# Patient Record
Sex: FEMALE | Race: OTHER | HISPANIC OR LATINO
[De-identification: names, ages, dates, MRNs, and addresses within clinical notes are randomized per-mention and may not be internally consistent; named-entity substitution may affect disease eponyms.]

---

## 2017-01-31 ENCOUNTER — APPOINTMENT (OUTPATIENT)
Dept: OBGYN | Facility: CLINIC | Age: 22
End: 2017-01-31

## 2017-01-31 VITALS — DIASTOLIC BLOOD PRESSURE: 52 MMHG | SYSTOLIC BLOOD PRESSURE: 108 MMHG | WEIGHT: 112 LBS

## 2020-05-18 ENCOUNTER — EMERGENCY (EMERGENCY)
Facility: HOSPITAL | Age: 25
LOS: 1 days | Discharge: HOME | End: 2020-05-18
Attending: STUDENT IN AN ORGANIZED HEALTH CARE EDUCATION/TRAINING PROGRAM | Admitting: STUDENT IN AN ORGANIZED HEALTH CARE EDUCATION/TRAINING PROGRAM
Payer: MEDICAID

## 2020-05-18 VITALS
DIASTOLIC BLOOD PRESSURE: 81 MMHG | HEART RATE: 88 BPM | RESPIRATION RATE: 16 BRPM | TEMPERATURE: 100 F | OXYGEN SATURATION: 100 % | SYSTOLIC BLOOD PRESSURE: 126 MMHG

## 2020-05-18 LAB
ALBUMIN SERPL ELPH-MCNC: 4.5 G/DL — SIGNIFICANT CHANGE UP (ref 3.3–5)
ALP SERPL-CCNC: 69 U/L — SIGNIFICANT CHANGE UP (ref 40–120)
ALT FLD-CCNC: 13 U/L — SIGNIFICANT CHANGE UP (ref 4–33)
ANION GAP SERPL CALC-SCNC: 14 MMO/L — SIGNIFICANT CHANGE UP (ref 7–14)
AST SERPL-CCNC: 17 U/L — SIGNIFICANT CHANGE UP (ref 4–32)
BASOPHILS # BLD AUTO: 0.03 K/UL — SIGNIFICANT CHANGE UP (ref 0–0.2)
BASOPHILS NFR BLD AUTO: 0.3 % — SIGNIFICANT CHANGE UP (ref 0–2)
BILIRUB SERPL-MCNC: 0.4 MG/DL — SIGNIFICANT CHANGE UP (ref 0.2–1.2)
BUN SERPL-MCNC: 8 MG/DL — SIGNIFICANT CHANGE UP (ref 7–23)
CALCIUM SERPL-MCNC: 9.5 MG/DL — SIGNIFICANT CHANGE UP (ref 8.4–10.5)
CHLORIDE SERPL-SCNC: 104 MMOL/L — SIGNIFICANT CHANGE UP (ref 98–107)
CO2 SERPL-SCNC: 23 MMOL/L — SIGNIFICANT CHANGE UP (ref 22–31)
CREAT SERPL-MCNC: 0.66 MG/DL — SIGNIFICANT CHANGE UP (ref 0.5–1.3)
EOSINOPHIL # BLD AUTO: 0.1 K/UL — SIGNIFICANT CHANGE UP (ref 0–0.5)
EOSINOPHIL NFR BLD AUTO: 0.8 % — SIGNIFICANT CHANGE UP (ref 0–6)
GLUCOSE SERPL-MCNC: 89 MG/DL — SIGNIFICANT CHANGE UP (ref 70–99)
HCT VFR BLD CALC: 42.4 % — SIGNIFICANT CHANGE UP (ref 34.5–45)
HGB BLD-MCNC: 14.5 G/DL — SIGNIFICANT CHANGE UP (ref 11.5–15.5)
IMM GRANULOCYTES NFR BLD AUTO: 0.3 % — SIGNIFICANT CHANGE UP (ref 0–1.5)
LYMPHOCYTES # BLD AUTO: 1.88 K/UL — SIGNIFICANT CHANGE UP (ref 1–3.3)
LYMPHOCYTES # BLD AUTO: 15.7 % — SIGNIFICANT CHANGE UP (ref 13–44)
MAGNESIUM SERPL-MCNC: 2 MG/DL — SIGNIFICANT CHANGE UP (ref 1.6–2.6)
MCHC RBC-ENTMCNC: 30 PG — SIGNIFICANT CHANGE UP (ref 27–34)
MCHC RBC-ENTMCNC: 34.2 % — SIGNIFICANT CHANGE UP (ref 32–36)
MCV RBC AUTO: 87.6 FL — SIGNIFICANT CHANGE UP (ref 80–100)
MONOCYTES # BLD AUTO: 0.57 K/UL — SIGNIFICANT CHANGE UP (ref 0–0.9)
MONOCYTES NFR BLD AUTO: 4.8 % — SIGNIFICANT CHANGE UP (ref 2–14)
NEUTROPHILS # BLD AUTO: 9.37 K/UL — HIGH (ref 1.8–7.4)
NEUTROPHILS NFR BLD AUTO: 78.1 % — HIGH (ref 43–77)
NRBC # FLD: 0 K/UL — SIGNIFICANT CHANGE UP (ref 0–0)
PHOSPHATE SERPL-MCNC: 3.4 MG/DL — SIGNIFICANT CHANGE UP (ref 2.5–4.5)
PLATELET # BLD AUTO: 251 K/UL — SIGNIFICANT CHANGE UP (ref 150–400)
PMV BLD: 11.4 FL — SIGNIFICANT CHANGE UP (ref 7–13)
POTASSIUM SERPL-MCNC: 4.1 MMOL/L — SIGNIFICANT CHANGE UP (ref 3.5–5.3)
POTASSIUM SERPL-SCNC: 4.1 MMOL/L — SIGNIFICANT CHANGE UP (ref 3.5–5.3)
PROT SERPL-MCNC: 8 G/DL — SIGNIFICANT CHANGE UP (ref 6–8.3)
RBC # BLD: 4.84 M/UL — SIGNIFICANT CHANGE UP (ref 3.8–5.2)
RBC # FLD: 12.3 % — SIGNIFICANT CHANGE UP (ref 10.3–14.5)
SODIUM SERPL-SCNC: 141 MMOL/L — SIGNIFICANT CHANGE UP (ref 135–145)
WBC # BLD: 11.99 K/UL — HIGH (ref 3.8–10.5)
WBC # FLD AUTO: 11.99 K/UL — HIGH (ref 3.8–10.5)

## 2020-05-18 PROCEDURE — 99285 EMERGENCY DEPT VISIT HI MDM: CPT | Mod: 25

## 2020-05-18 PROCEDURE — 93010 ELECTROCARDIOGRAM REPORT: CPT

## 2020-05-18 RX ORDER — ACETAMINOPHEN 500 MG
650 TABLET ORAL ONCE
Refills: 0 | Status: COMPLETED | OUTPATIENT
Start: 2020-05-18 | End: 2020-05-18

## 2020-05-18 RX ORDER — SODIUM CHLORIDE 9 MG/ML
1000 INJECTION INTRAMUSCULAR; INTRAVENOUS; SUBCUTANEOUS ONCE
Refills: 0 | Status: COMPLETED | OUTPATIENT
Start: 2020-05-18 | End: 2020-05-18

## 2020-05-18 RX ADMIN — Medication 650 MILLIGRAM(S): at 19:13

## 2020-05-18 RX ADMIN — SODIUM CHLORIDE 1000 MILLILITER(S): 9 INJECTION INTRAMUSCULAR; INTRAVENOUS; SUBCUTANEOUS at 19:13

## 2020-05-18 NOTE — ED PROVIDER NOTE - NSFOLLOWUPCLINICS_GEN_ALL_ED_FT
[facility] - Primary Care  Primary Care  [address] [place] [address]  Phone: [phone]  Fax:   Follow Up Time: 4-6 Days

## 2020-05-18 NOTE — ED PROVIDER NOTE - PATIENT PORTAL LINK FT
You can access the FollowMyHealth Patient Portal offered by [facility] by registering at the following website: [email or web address]. By joining [employer]’s FollowMyHealth portal, you will also be able to view your health information using other applications (apps) compatible with our system.

## 2020-05-18 NOTE — ED PROVIDER NOTE - CLINICAL SUMMARY MEDICAL DECISION MAKING FREE TEXT BOX
here after 2 syncopal episodes in the setting of exercise. ekg without signs of HOCM, arrhythmia, QT prolongation or ischemia. neuro exam nonfocal at this time, no concern for seizure given no postictal period no tongue biting and no incontinence. will evaluate for electrolyte disturbance, anemia, pregnancy with labs, and hcg, give fluids and tylenol and reassess, concussion precautions discussed.

## 2020-05-18 NOTE — ED PROVIDER NOTE - PHYSICAL EXAMINATION
Stephanie Rios M.D.:   patient awake alert NAD .   LUNGS CTAB no wheeze no crackle.   CARD RRR no m/r/g.    Abdomen soft NT ND no rebound no guarding no CVA tenderness.   EXT WWP no edema no calf tenderness CV 2+DP/PT bilaterally.   neuro A&Ox3 cn2-12 intact gross motor and sensory intact in all extremities gait normal.    skin warm and dry no rash  HEENT: moist mucous membranes, PERRL, EOMI
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
## 2020-05-18 NOTE — ED PROVIDER NOTE - PROGRESS NOTE DETAILS
Demetris ANSARI MD PGY2: Patient continues to do well. Pain on head exclusively when touching it. WIll d/c to follow-up with PMD.

## 2020-05-18 NOTE — ED PROVIDER NOTE - NSFOLLOWUPINSTRUCTIONS_ED_ALL_ED_FT
Please return to the ED for any concerns, including another passing out episode, nausea, vomiting, weakness, or numbness.     Please follow-up with your primary care doctor in the next week.     Here in the ED your blood work was normal and you were observed for 2.5 hours. Please return to the ED for any concerns, including another passing out episode, nausea, vomiting, weakness, or numbness.     Please follow-up with your primary care doctor in the next week.

## 2020-05-18 NOTE — ED PROVIDER NOTE - OBJECTIVE STATEMENT
Stephanie Rios M.D: 24F hx cleft palate s/p repair presenting after syncopal episode. notes she was in usual state of health, was bioking with family when she started to feel chest tightness and then her vision went black and she passed out and hti her head. +LOC xfew seconds. woke up was back to baseline mental status and then passed out again for a few seconds. no further episodes. currently c/o headache, posterior. no vision changes no abd pain no n/v no known bleeding no dizziness/lightheadedness, no more chest tightness. notes she gets chest tightness with her allergies sometimes.
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
## 2023-01-05 ENCOUNTER — EMERGENCY (EMERGENCY)
Facility: HOSPITAL | Age: 28
LOS: 1 days | Discharge: HOME | End: 2023-01-05
Attending: EMERGENCY MEDICINE | Admitting: EMERGENCY MEDICINE
Payer: MEDICAID

## 2023-01-05 VITALS
SYSTOLIC BLOOD PRESSURE: 139 MMHG | RESPIRATION RATE: 20 BRPM | OXYGEN SATURATION: 97 % | HEART RATE: 89 BPM | DIASTOLIC BLOOD PRESSURE: 74 MMHG | TEMPERATURE: 98 F

## 2023-01-05 PROCEDURE — 99284 EMERGENCY DEPT VISIT MOD MDM: CPT

## 2023-01-05 RX ORDER — DEXAMETHASONE 0.5 MG/5ML
6 ELIXIR ORAL ONCE
Refills: 0 | Status: COMPLETED | OUTPATIENT
Start: 2023-01-05 | End: 2023-01-05

## 2023-01-05 RX ORDER — MECLIZINE HCL 12.5 MG
25 TABLET ORAL ONCE
Refills: 0 | Status: COMPLETED | OUTPATIENT
Start: 2023-01-05 | End: 2023-01-05

## 2023-01-05 RX ORDER — MECLIZINE HCL 12.5 MG
1 TABLET ORAL
Qty: 9 | Refills: 0
Start: 2023-01-05 | End: 2023-01-07

## 2023-01-05 RX ORDER — ONDANSETRON 8 MG/1
4 TABLET, FILM COATED ORAL ONCE
Refills: 0 | Status: COMPLETED | OUTPATIENT
Start: 2023-01-05 | End: 2023-01-05

## 2023-01-05 RX ADMIN — Medication 6 MILLIGRAM(S): at 13:43

## 2023-01-05 RX ADMIN — Medication 25 MILLIGRAM(S): at 13:43

## 2023-01-05 RX ADMIN — ONDANSETRON 4 MILLIGRAM(S): 8 TABLET, FILM COATED ORAL at 13:43

## 2023-01-05 NOTE — ED PROVIDER NOTE - OBJECTIVE STATEMENT
27-year-old female with past medical history cleft palate s/p surgical repair presenting to the ER with dizziness, nausea and sinus pressure.  Patient reports since October she has been on 3 different antibiotics by her ENT for sinus infection.  Patient reports the first antibiotic was Augmentin, followed by a second course of Augmentin, then followed by Keflex.  Patient recently saw her ENT on 12/28 and was started on cefdinir, saw her allergist 2 days ago and was prescribed albuterol for chest tightness.  Patient states this morning she felt nausea and dizziness and continued to have sinus pressure along her forehead and the left side of her head.  Patient denies fever/chills, sore throat, ear pain, chest pain, shortness of breath, cough, abdominal pain, nausea, vomiting, diarrhea, recent travel or any other concerns.

## 2023-01-05 NOTE — ED ADULT NURSE NOTE - OBJECTIVE STATEMENT
27 year old female received to [facility]. AAOx4 ambulatory, steady gait noted. Pt c/o heavy vaginal bleeding every month. Pt using 3 pads so far today ~q3hrs, saturating 100% pad. Pt states "last month and the month before I change my pads every 20mintues and clots". Pt endorses one large clot while in ED today. Pt denies hx blood transfusions. Pt endorses weakness, dizziness, lightheadedness, "feeling like I'm going to faint". Pt endorses mild 2/10 LLQ pain. Pt states "I started birth control 2 wks ago to help with the heavy periods and it has just gotten worse". Respirations even and unlabored. Skin warm dry intact. PIV #20 right AC, labs drawn and sent. VS as noted. Bed in lowest position, safety maintained. EKG in progress 27 year old female received to [facility]. AAOx4 ambulatory, steady gait noted. Pt c/o dizziness, nausea, sinus pressure. Respirations even and unlabored. Skin intact. Medicated pt per MD orders. Covid swab sent. VS as noted. Safety maintained

## 2023-01-05 NOTE — ED PROVIDER NOTE - PATIENT PORTAL LINK FT
You can access the FollowMyHealth Patient Portal offered by [facility] by registering at the following website: [email or web address]. By joining [employer]’s FollowMyHealth portal, you will also be able to view your health information using other applications (apps) compatible with our system.

## 2023-01-05 NOTE — ED PROVIDER NOTE - NSFOLLOWUPINSTRUCTIONS_ED_ALL_ED_FT
Follow-up with your primary care doctor within 1 week.    Take meclizine 25 mg (1 tablet) every 8 hours as needed for dizziness.    Continue using medications as previously prescribed to you.    Return to the ER with any worsening or concerning symptoms, fever/chills, worsening headache, vomiting, difficulty walking, numbness, weakness or any other concerns.

## 2023-01-05 NOTE — ED ADULT NURSE NOTE - NSIMPLEMENTINTERV_GEN_ALL_ED
Implemented All Fall Risk Interventions:  [place] to call system. Call bell, personal items and telephone within reach. Instruct patient to call for assistance. Room bathroom lighting operational. Non-slip footwear when patient is off stretcher. Physically safe environment: no spills, clutter or unnecessary equipment. Stretcher in lowest position, wheels locked, appropriate side rails in place. Provide visual cue, wrist band, yellow gown, etc. Monitor gait and stability. Monitor for mental status changes and reorient to person, place, and time. Review medications for side effects contributing to fall risk. Reinforce activity limits and safety measures with patient and family.

## 2023-01-05 NOTE — ED PROVIDER NOTE - CLINICAL SUMMARY MEDICAL DECISION MAKING FREE TEXT BOX
27-year-old female with past medical history cleft palate s/p surgical repair presenting to the ER with dizziness, nausea and sinus pressure. Patient with prior history of multiple sinus infections/sinus congestion follows closely with an ENT and allergist.  On exam patient is well-appearing, afebrile, vitals within normal, nonfocal neuro exam, no tenderness to palpation over sinuses, TMs clear bilaterally.  Concern for vertigo/sinus congestion, less likely bacterial in nature as patient is afebrile and given duration of symptoms.  Plan; Decadron, trial meclizine and Zofran.  Patient to follow-up with her ENT as an outpatient.

## 2023-05-25 ENCOUNTER — EMERGENCY (EMERGENCY)
Facility: HOSPITAL | Age: 28
LOS: 1 days | Discharge: HOME | End: 2023-05-25
Admitting: STUDENT IN AN ORGANIZED HEALTH CARE EDUCATION/TRAINING PROGRAM
Payer: MEDICAID

## 2023-05-25 VITALS
RESPIRATION RATE: 16 BRPM | DIASTOLIC BLOOD PRESSURE: 64 MMHG | TEMPERATURE: 98 F | SYSTOLIC BLOOD PRESSURE: 105 MMHG | OXYGEN SATURATION: 100 % | HEART RATE: 70 BPM

## 2023-05-25 VITALS
SYSTOLIC BLOOD PRESSURE: 139 MMHG | TEMPERATURE: 98 F | RESPIRATION RATE: 16 BRPM | OXYGEN SATURATION: 100 % | HEART RATE: 90 BPM | DIASTOLIC BLOOD PRESSURE: 85 MMHG

## 2023-05-25 LAB
ALBUMIN SERPL ELPH-MCNC: 4.7 G/DL — SIGNIFICANT CHANGE UP (ref 3.3–5)
ALP SERPL-CCNC: 80 U/L — SIGNIFICANT CHANGE UP (ref 40–120)
ALT FLD-CCNC: 13 U/L — SIGNIFICANT CHANGE UP (ref 4–33)
ANION GAP SERPL CALC-SCNC: 12 MMOL/L — SIGNIFICANT CHANGE UP (ref 7–14)
AST SERPL-CCNC: 14 U/L — SIGNIFICANT CHANGE UP (ref 4–32)
BASOPHILS # BLD AUTO: 0.04 K/UL — SIGNIFICANT CHANGE UP (ref 0–0.2)
BASOPHILS NFR BLD AUTO: 0.4 % — SIGNIFICANT CHANGE UP (ref 0–2)
BILIRUB SERPL-MCNC: 0.5 MG/DL — SIGNIFICANT CHANGE UP (ref 0.2–1.2)
BUN SERPL-MCNC: 9 MG/DL — SIGNIFICANT CHANGE UP (ref 7–23)
CALCIUM SERPL-MCNC: 9.6 MG/DL — SIGNIFICANT CHANGE UP (ref 8.4–10.5)
CHLORIDE SERPL-SCNC: 102 MMOL/L — SIGNIFICANT CHANGE UP (ref 98–107)
CO2 SERPL-SCNC: 24 MMOL/L — SIGNIFICANT CHANGE UP (ref 22–31)
CREAT SERPL-MCNC: 0.63 MG/DL — SIGNIFICANT CHANGE UP (ref 0.5–1.3)
EGFR: 125 ML/MIN/1.73M2 — SIGNIFICANT CHANGE UP
EOSINOPHIL # BLD AUTO: 0.27 K/UL — SIGNIFICANT CHANGE UP (ref 0–0.5)
EOSINOPHIL NFR BLD AUTO: 2.5 % — SIGNIFICANT CHANGE UP (ref 0–6)
GLUCOSE SERPL-MCNC: 81 MG/DL — SIGNIFICANT CHANGE UP (ref 70–99)
HCT VFR BLD CALC: 45 % — SIGNIFICANT CHANGE UP (ref 34.5–45)
HGB BLD-MCNC: 14.8 G/DL — SIGNIFICANT CHANGE UP (ref 11.5–15.5)
IANC: 7.33 K/UL — SIGNIFICANT CHANGE UP (ref 1.8–7.4)
IMM GRANULOCYTES NFR BLD AUTO: 0.3 % — SIGNIFICANT CHANGE UP (ref 0–0.9)
LYMPHOCYTES # BLD AUTO: 2.51 K/UL — SIGNIFICANT CHANGE UP (ref 1–3.3)
LYMPHOCYTES # BLD AUTO: 23.3 % — SIGNIFICANT CHANGE UP (ref 13–44)
MCHC RBC-ENTMCNC: 29.4 PG — SIGNIFICANT CHANGE UP (ref 27–34)
MCHC RBC-ENTMCNC: 32.9 GM/DL — SIGNIFICANT CHANGE UP (ref 32–36)
MCV RBC AUTO: 89.3 FL — SIGNIFICANT CHANGE UP (ref 80–100)
MONOCYTES # BLD AUTO: 0.6 K/UL — SIGNIFICANT CHANGE UP (ref 0–0.9)
MONOCYTES NFR BLD AUTO: 5.6 % — SIGNIFICANT CHANGE UP (ref 2–14)
NEUTROPHILS # BLD AUTO: 7.33 K/UL — SIGNIFICANT CHANGE UP (ref 1.8–7.4)
NEUTROPHILS NFR BLD AUTO: 67.9 % — SIGNIFICANT CHANGE UP (ref 43–77)
NRBC # BLD: 0 /100 WBCS — SIGNIFICANT CHANGE UP (ref 0–0)
NRBC # FLD: 0 K/UL — SIGNIFICANT CHANGE UP (ref 0–0)
PLATELET # BLD AUTO: 245 K/UL — SIGNIFICANT CHANGE UP (ref 150–400)
POTASSIUM SERPL-MCNC: 4 MMOL/L — SIGNIFICANT CHANGE UP (ref 3.5–5.3)
POTASSIUM SERPL-SCNC: 4 MMOL/L — SIGNIFICANT CHANGE UP (ref 3.5–5.3)
PROT SERPL-MCNC: 7.7 G/DL — SIGNIFICANT CHANGE UP (ref 6–8.3)
RBC # BLD: 5.04 M/UL — SIGNIFICANT CHANGE UP (ref 3.8–5.2)
RBC # FLD: 12 % — SIGNIFICANT CHANGE UP (ref 10.3–14.5)
SODIUM SERPL-SCNC: 138 MMOL/L — SIGNIFICANT CHANGE UP (ref 135–145)
WBC # BLD: 10.78 K/UL — HIGH (ref 3.8–10.5)
WBC # FLD AUTO: 10.78 K/UL — HIGH (ref 3.8–10.5)

## 2023-05-25 PROCEDURE — 99284 EMERGENCY DEPT VISIT MOD MDM: CPT

## 2023-05-25 RX ORDER — SODIUM CHLORIDE 9 MG/ML
1000 INJECTION INTRAMUSCULAR; INTRAVENOUS; SUBCUTANEOUS ONCE
Refills: 0 | Status: COMPLETED | OUTPATIENT
Start: 2023-05-25 | End: 2023-05-25

## 2023-05-25 RX ORDER — ACETAMINOPHEN 500 MG
650 TABLET ORAL ONCE
Refills: 0 | Status: COMPLETED | OUTPATIENT
Start: 2023-05-25 | End: 2023-05-25

## 2023-05-25 RX ORDER — METOCLOPRAMIDE HCL 10 MG
10 TABLET ORAL ONCE
Refills: 0 | Status: COMPLETED | OUTPATIENT
Start: 2023-05-25 | End: 2023-05-25

## 2023-05-25 RX ORDER — KETOROLAC TROMETHAMINE 30 MG/ML
15 SYRINGE (ML) INJECTION ONCE
Refills: 0 | Status: DISCONTINUED | OUTPATIENT
Start: 2023-05-25 | End: 2023-05-25

## 2023-05-25 RX ADMIN — SODIUM CHLORIDE 1000 MILLILITER(S): 9 INJECTION INTRAMUSCULAR; INTRAVENOUS; SUBCUTANEOUS at 17:18

## 2023-05-25 RX ADMIN — Medication 650 MILLIGRAM(S): at 17:17

## 2023-05-25 RX ADMIN — Medication 15 MILLIGRAM(S): at 17:18

## 2023-05-25 RX ADMIN — Medication 10 MILLIGRAM(S): at 17:17

## 2023-05-25 NOTE — ED PROVIDER NOTE - CLINICAL SUMMARY MEDICAL DECISION MAKING FREE TEXT BOX
26 y/o F with pmhx of vertigo, recurrent ear infections, hearing impairment with R hearing aid in place and cleft palate s/p repair presents to the ED c/o R sided frontal HA x 1 week.   Patient currently afebrile, hemodynamically stable, spO2 100%. Physical exam with no focal neuro deficits.   Based on history and physical, differentials include but are not limited to migraine vs tension HA vs electrolyte d/o and dehydration. Plan to assess patient for acute pathology as listed above with labs. Will administer medications for symptomatic relief, follow-up on results, and reassess.

## 2023-05-25 NOTE — ED ADULT NURSE NOTE - NSFALLUNIVINTERV_ED_ALL_ED
Bed/Stretcher in lowest position, wheels locked, appropriate side rails in place/Call bell, personal items and telephone in reach/Instruct patient to call for assistance before getting out of bed/chair/stretcher/Non-slip footwear applied when patient is off stretcher/[place] to call system/Physically safe environment - no spills, clutter or unnecessary equipment/Purposeful proactive rounding/Room/bathroom lighting operational, light cord in reach

## 2023-05-25 NOTE — ED PROVIDER NOTE - NSFOLLOWUPINSTRUCTIONS_ED_ALL_ED_FT
You were seen in the ED for headache.   A copy of your results from today's visit is included.   Please follow-up with your primary care doctor and a neurologist. We will call you to help you set up an appointment with the neurologist.   To control your pain, alternate between tylenol and ibuprofen as directed.   Return to the ED if you develop severe pain or pain does not go away with medication, neck pain and stiffness, fever, any changes in your vision, numbness or tingling.     Acute Headache    WHAT YOU NEED TO KNOW:    An acute headache is pain or discomfort that starts suddenly and gets worse quickly. You may have an acute headache only when you feel stress or eat certain foods. Other acute headache pain can happen every day, and sometimes several times a day.    DISCHARGE INSTRUCTIONS:    Return to the emergency department if:    You have severe pain.    You have numbness or weakness on one side of your face or body.    You have a headache that occurs after a blow to the head, a fall, or other trauma.    You have a headache, are forgetful or confused, or have trouble speaking.    You have a headache, stiff neck, and a fever.  Contact your healthcare provider if:    You have a constant headache and are vomiting.    You have a headache each day that does not get better, even after treatment.    You have changes in your headaches, or new symptoms that occur when you have a headache.    You have questions or concerns about your condition or care.  Medicines: You may need any of the following:    Prescription pain medicine may be given. The medicine your healthcare provider recommends will depend on the kind of headaches you have. You will need to take prescription headache medicines as directed to prevent a problem called rebound headache. These headaches happen with regular use of pain relievers for headache disorders.    NSAIDs, such as ibuprofen, help decrease swelling, pain, and fever. This medicine is available with or without a doctor's order. NSAIDs can cause stomach bleeding or kidney problems in certain people. If you take blood thinner medicine, always ask your healthcare provider if NSAIDs are safe for you. Always read the medicine label and follow directions.    Acetaminophen decreases pain and fever. It is available without a doctor's order. Ask how much to take and how often to take it. Follow directions. Read the labels of all other medicines you are using to see if they also contain acetaminophen, or ask your doctor or pharmacist. Acetaminophen can cause liver damage if not taken correctly. Do not use more than 3 grams (3,000 milligrams) total of acetaminophen in one day.    Antidepressants may be given for some kinds of headaches.    Take your medicine as directed. Contact your healthcare provider if you think your medicine is not helping or if you have side effects. Tell him or her if you are allergic to any medicine. Keep a list of the medicines, vitamins, and herbs you take. Include the amounts, and when and why you take them. Bring the list or the pill bottles to follow-up visits. Carry your medicine list with you in case of an emergency.  Manage your symptoms:    Apply heat or ice on the headache area. Use a heat or ice pack. For an ice pack, you can also put crushed ice in a plastic bag. Cover the pack or bag with a towel before you apply it to your skin. Ice and heat both help decrease pain, and heat also helps decrease muscle spasms. Apply heat for 20 to 30 minutes every 2 hours. Apply ice for 15 to 20 minutes every hour. Apply heat or ice for as long and for as many days as directed. You may alternate heat and ice.    Relax your muscles. Lie down in a comfortable position and close your eyes. Relax your muscles slowly. Start at your toes and work your way up your body.    Keep a record of your headaches. Write down when your headaches start and stop. Include your symptoms and what you were doing when the headache began. Record what you ate or drank for 24 hours before the headache started. Describe the pain and where it hurts. Keep track of what you did to treat your headache and if it worked.  Prevent an acute headache:    Avoid anything that triggers an acute headache. Examples include exposure to chemicals, going to high altitude, or not getting enough sleep. Create a regular sleep routine. Go to sleep at the same time and wake up at the same time each day. Do not use electronic devices before bedtime. These may trigger a headache or prevent you from sleeping well.    Do not smoke. Nicotine and other chemicals in cigarettes and cigars can trigger an acute headache or make it worse. Ask your healthcare provider for information if you currently smoke and need help to quit. E-cigarettes or smokeless tobacco still contain nicotine. Talk to your healthcare provider before you use these products.    Limit alcohol as directed. Alcohol can trigger an acute headache or make it worse. If you have cluster headaches, do not drink alcohol during an episode. For other types of headaches, ask your healthcare provider if it is safe for you to drink alcohol. Ask how much is safe for you to drink, and how often.    Exercise as directed. Exercise can reduce tension and help with headache pain. Aim for 30 minutes of physical activity on most days of the week. Your healthcare provider can help you create an exercise plan.    Eat a variety of healthy foods. Healthy foods include fruits, vegetables, low-fat dairy products, lean meats, fish, whole grains, and cooked beans. Your healthcare provider or dietitian can help you create meals plans if you need to avoid foods that trigger headaches.  Follow up with your healthcare provider as directed: Bring your headache record with you when you see your healthcare provider. Write down your questions so you remember to ask them during your visits.

## 2023-05-25 NOTE — ED PROVIDER NOTE - PATIENT PORTAL LINK FT
You can access the FollowMyHealth Patient Portal offered by [facility] by registering at the following website: [email or web address]. By joining [employer]s FollowMyHealth portal, you will also be able to view your health information using other applications (apps) compatible with our system.

## 2023-05-25 NOTE — ED ADULT TRIAGE NOTE - CHIEF COMPLAINT QUOTE
Pt with hx of vertigo c/o R sided headache, nausea, sensitivity to light x 8 days .Denies dizziness, chest pain, sob

## 2023-05-25 NOTE — ED PROVIDER NOTE - OBJECTIVE STATEMENT
26 y/o F with pmhx of vertigo, recurrent ear infections, hearing impairment with R hearing aid in place and cleft palate s/p repair presents to the ED c/o R sided frontal HA x 1 week. Pt states last week she started to feel lightheaded similar to how prior episodes of vertigo began and reports a gradual onset HA that has been constant since, lightheadedness now resolved. Pt describes HA as pounding in sensation, worse with light and rated as 10/10. Pt reports associated nausea and decreased appetite. Pt has hx of HA with vertigo episodes and states this HA feels similar to prior HA. Pt tried taking Excedrin last night, which usually helps HA in the past, with no relief. Pt tried taking Tylenol this AM around 0900. Denies fever/chills, neck pain, changes in vision, eye pain, dizziness or lightheadedness at this time, extremity numbness/tingling/weakness, ear pain.

## 2023-05-25 NOTE — ED PROVIDER NOTE - NSFOLLOWUPCLINICS_GEN_ALL_ED_FT
[facility]  Neurology  [address] [place] [address] [address]rd [place] [address]  Phone: [phone]  Fax:     [facility]  Neurology  [address] [place] [address]  Phone: [phone]  Fax: [phone]

## 2023-05-25 NOTE — ED PROVIDER NOTE - PHYSICAL EXAMINATION
General: Well appearing in no acute distress, alert and cooperative  Head: Normocephalic, atraumatic.   Eyes: PERRLA, no conjunctival injection, no scleral icterus, EOMI  ENMT: Hearing aid in R ear. Atraumatic external nose and ears, moist mucous membranes, oropharynx clear. Nontender over temporal area b/l.   Neck: Soft and supple, full ROM without pain, no midline tenderness.  Cardiac: Regular rate and regular rhythm, no murmurs, peripheral pulses 2+ and symmetric in all extremities  Resp: Unlabored respiratory effort, lungs CTAB, speaking in full sentences, no wheezes  Abd: Soft, non-tender, non-distended, no guarding or rebound tenderness  MSK: Spine midline and non-tender, no CVA tenderness   Skin: Warm and dry, no rashes/abrasions/lacerations  Neuro: AO x 3, CN 2-12 intact. Moves all extremities symmetrically, Motor strength 5/5 bilaterally UE and LE, sensation grossly intact. No facial droop. No pronator drift. No focal neuro deficits

## 2023-05-25 NOTE — ED PROVIDER NOTE - PROGRESS NOTE DETAILS
Supervising Statement (BABAR FERREIRA): I have personally seen and examined this patient. I have evaluated the ACP Fellow's note and agree except as noted.  Pt reports gradual onset frontal headache 1 week ago, constant, initially mild to moderate intensity gradually worsening to maximal severe intensity.  Pt reports headaches feel similar to headaches in the past, however, pain lasting longer than usual.  Denies fevers, neck pain/stiffness, head trauma, vision loss, blurred vision, dizziness, numbness, weakness, use of blood thinners, illicit drug use.  Neuro exam normal.  Very low clinical suspicion for disease processes including but not limited to SAH, intracranial hemorrhage, meningitis, CSVT, temporal arteritis.  Agree with labs and medication for headache.  Agree that imaging, LP or expert consultation not indicated at this time. JHON Hitchcock: Workup reviewed. Labs within normal limits. Reassessment performed. Pt reports significant improvement in pain. Patient is medically stable for discharge. Strict return precautions given. Patient to follow up with PMD and neuro. Patient displays understanding and agreeable with plan, comfortable with discharge plan home.

## 2023-06-13 PROBLEM — R42 DIZZINESS AND GIDDINESS: Chronic | Status: ACTIVE | Noted: 2023-05-25

## 2023-10-17 ENCOUNTER — APPOINTMENT (OUTPATIENT)
Dept: NEUROLOGY | Facility: HOSPITAL | Age: 28
End: 2023-10-17

## 2024-10-18 NOTE — ED ADULT NURSE NOTE - SUICIDE SCREENING DEPRESSION
Pt is scheduled to have surgery on Wed 10/23/24; clearance form was faxed on 9/23/24 by [employer] contact #[phone] ext [phone]; needs form faxed back ASAP       Completed and faxed  
Negative

## 2025-01-15 NOTE — ED ADULT NURSE NOTE - OBJECTIVE STATEMENT
left
Pt is a 28 y/o Female, A&Ox4, ambulatory with past medical hx of vertigo, cleft palate, and hearing impairment, presents to the ED with c/o right sided headache and nausea x 8 days. Pt denies chest pain, SOB, fever, cough, chills, N/V/D, numbness/tingling, dizziness, blurred vision, changes in vision or any urinary symptoms at this time. NAD noted. Neuro/sensory intact, strength equal b/l. Respirations even and unlabored, chest rise equal. 22g IVL placed in left hand. Labs collected and sent. Medications administered as ordered, see MAR.  Safety maintained throughout. Will continue to monitor.

## 2025-04-16 NOTE — ED PROVIDER NOTE - IV ALTEPLASE DOOR HIDDEN
show Detail Level: Detailed Quality 47: Advance Care Plan: Advance Care Planning discussed and documented; advance care plan or surrogate decision maker documented in the medical record. Quality 226: Preventive Care And Screening: Tobacco Use: Screening And Cessation Intervention: Patient screened for tobacco use and is an ex/non-smoker <<----- Click to add NO pertinent Past Medical History No pertinent past medical history